# Patient Record
Sex: MALE | Race: WHITE | NOT HISPANIC OR LATINO | URBAN - METROPOLITAN AREA
[De-identification: names, ages, dates, MRNs, and addresses within clinical notes are randomized per-mention and may not be internally consistent; named-entity substitution may affect disease eponyms.]

---

## 2023-12-14 ENCOUNTER — APPOINTMENT (OUTPATIENT)
Dept: UROLOGY | Facility: CLINIC | Age: 83
End: 2023-12-14
Payer: MEDICARE

## 2024-01-22 ENCOUNTER — APPOINTMENT (OUTPATIENT)
Dept: UROLOGY | Facility: CLINIC | Age: 84
End: 2024-01-22
Payer: MEDICARE

## 2024-01-23 DIAGNOSIS — C61 MALIGNANT NEOPLASM OF PROSTATE (MULTI): Primary | ICD-10-CM

## 2024-02-01 PROBLEM — N52.9 ERECTILE DYSFUNCTION: Status: ACTIVE | Noted: 2024-02-01

## 2024-02-01 PROBLEM — C61 PROSTATE CA (MULTI): Status: ACTIVE | Noted: 2024-02-01

## 2024-02-01 PROBLEM — R97.20 ELEVATED PSA: Status: ACTIVE | Noted: 2024-02-01

## 2024-02-01 PROBLEM — R35.1 NOCTURIA: Status: ACTIVE | Noted: 2024-02-01

## 2024-02-01 ASSESSMENT — ENCOUNTER SYMPTOMS
DIFFICULTY URINATING: 0
FEVER: 0
PSYCHIATRIC NEGATIVE: 1
NAUSEA: 0
COUGH: 0
EYES NEGATIVE: 1
ENDOCRINE NEGATIVE: 1
CHILLS: 0
ALLERGIC/IMMUNOLOGIC NEGATIVE: 1
SHORTNESS OF BREATH: 0

## 2024-02-01 NOTE — PROGRESS NOTES
Subjective   Patient ID: Efra Retana is a 83 y.o. male.    HPI  Patient has a hx of prostate cancer. Dx 4/2021 [place] 4+4=8. . First and only Lupron was on 12/2/21. . Bone scan on 4/2021 showed no METS. Most recent PSA was 1.0 (1/24) 0.1 (6/23) Previous PSA was 0.71 on 12/22. Prior PSA was 1.15 on 4/22. Prior PSA was 2.7 on 10/2021. Prior PSA was 25.60 on 1/2021. Prior PSA was 34.30 on 10/2020. Prior PSA was 34.6 on 5/2020. Chronic LUT'S sx are mild and stable. Denies urgency and frequency. Denies dysuria. Denies hematuria. Nocturia x1. No medication for LUT'S. ED is chronic. No medication for this.          Review of Systems   Constitutional:  Negative for chills and fever.   HENT: Negative.     Eyes: Negative.    Respiratory:  Negative for cough and shortness of breath.    Cardiovascular:  Negative for chest pain and leg swelling.   Gastrointestinal:  Negative for nausea.   Endocrine: Negative.    Genitourinary:  Negative for difficulty urinating.        Negative except for documented in HPI   Allergic/Immunologic: Negative.    Neurological:         Alert & oriented X 3   Hematological:         Denies blood thinners   Psychiatric/Behavioral: Negative.         Objective   Physical Exam  Vitals and nursing note reviewed.   Constitutional:       General: He is not in acute distress.     Appearance: Normal appearance.   Pulmonary:      Effort: Pulmonary effort is normal.   Abdominal:      Tenderness: There is no abdominal tenderness.   Genitourinary:     Comments: Kidneys non palpable bilaterally  Bladder non palpable or tender  Scrotum no mass, No hydrocele  Epididymis- No spermatocele. Non Tender.  Testicles: No mass  Urethra: No discharge  Penis within normal limits... No lesions  Prostate - deferred  Neurological:      Mental Status: He is alert.         Assessment/Plan   Diagnoses and all orders for this visit:  Prostate CA (CMS/[facility])  Nocturia  Elevated PSA  Erectile dysfunction, unspecified erectile  dysfunction type    All available PSA values reviewed, Options discussed. Questions answered.  Pros/cons of Lupron Discussed-Patient wants to hold off   Diet changes for prostate health discussed and educational information given. Pros/Cons of prostate health supplements discussed.   Treatment options for LUTS reviewed  Discussed timed voiding. Discussed fluid and caffeine intake  Treatment options for ED reviewed.  Sildenafil Rx given  Lifestyle change to help prevent UTIs discussed. Encouraged fluid intake.    F/U with PSA 6 months

## 2024-02-05 ENCOUNTER — OFFICE VISIT (OUTPATIENT)
Dept: UROLOGY | Facility: CLINIC | Age: 84
End: 2024-02-05
Payer: MEDICARE

## 2024-02-05 VITALS — RESPIRATION RATE: 18 BRPM | WEIGHT: 186 LBS | BODY MASS INDEX: 25.23 KG/M2

## 2024-02-05 DIAGNOSIS — R35.1 NOCTURIA: ICD-10-CM

## 2024-02-05 DIAGNOSIS — N52.9 ERECTILE DYSFUNCTION, UNSPECIFIED ERECTILE DYSFUNCTION TYPE: ICD-10-CM

## 2024-02-05 DIAGNOSIS — R97.20 ELEVATED PSA: ICD-10-CM

## 2024-02-05 DIAGNOSIS — C61 PROSTATE CA (MULTI): ICD-10-CM

## 2024-02-05 PROCEDURE — 99214 OFFICE O/P EST MOD 30 MIN: CPT | Performed by: UROLOGY

## 2024-02-05 PROCEDURE — 1159F MED LIST DOCD IN RCRD: CPT | Performed by: UROLOGY

## 2024-02-05 PROCEDURE — 1036F TOBACCO NON-USER: CPT | Performed by: UROLOGY

## 2024-02-05 RX ORDER — MONTELUKAST SODIUM 10 MG/1
10 TABLET ORAL
COMMUNITY
Start: 2023-01-18

## 2024-02-05 RX ORDER — SILDENAFIL 100 MG/1
100 TABLET, FILM COATED ORAL AS NEEDED
Qty: 30 TABLET | Refills: 5 | Status: SHIPPED | OUTPATIENT
Start: 2024-02-05

## 2024-02-05 RX ORDER — ALBUTEROL SULFATE 90 UG/1
AEROSOL, METERED RESPIRATORY (INHALATION) EVERY 4 HOURS
COMMUNITY
Start: 2024-01-25

## 2024-02-05 RX ORDER — LEVOFLOXACIN 500 MG/1
500 TABLET, FILM COATED ORAL DAILY
COMMUNITY
Start: 2024-01-10 | End: 2024-01-17

## 2024-02-05 RX ORDER — SILDENAFIL 100 MG/1
100 TABLET, FILM COATED ORAL AS NEEDED
COMMUNITY
End: 2024-02-05 | Stop reason: SDUPTHER

## 2024-02-05 RX ORDER — METOPROLOL TARTRATE 100 MG/1
100 TABLET ORAL
COMMUNITY

## 2024-02-05 RX ORDER — METOPROLOL SUCCINATE 100 MG/1
100 TABLET, EXTENDED RELEASE ORAL
COMMUNITY
Start: 2015-09-21

## 2024-02-05 RX ORDER — AMLODIPINE BESYLATE 5 MG/1
5 TABLET ORAL
COMMUNITY
Start: 2023-08-08

## 2024-02-05 RX ORDER — FLUTICASONE PROPIONATE 50 MCG
SPRAY, SUSPENSION (ML) NASAL EVERY 24 HOURS
COMMUNITY
Start: 2024-01-15

## 2024-02-05 RX ORDER — POTASSIUM CHLORIDE 750 MG/1
CAPSULE, EXTENDED RELEASE ORAL
COMMUNITY
Start: 2019-10-19

## 2024-02-05 RX ORDER — OLMESARTAN MEDOXOMIL AND HYDROCHLOROTHIAZIDE 20/12.5 20; 12.5 MG/1; MG/1
1 TABLET ORAL DAILY PRN
COMMUNITY
Start: 2023-02-27

## 2024-02-05 RX ORDER — PANTOPRAZOLE SODIUM 40 MG/1
TABLET, DELAYED RELEASE ORAL EVERY 24 HOURS
COMMUNITY

## 2024-08-05 ENCOUNTER — APPOINTMENT (OUTPATIENT)
Dept: UROLOGY | Facility: CLINIC | Age: 84
End: 2024-08-05
Payer: MEDICARE

## 2024-08-09 ASSESSMENT — ENCOUNTER SYMPTOMS
PSYCHIATRIC NEGATIVE: 1
EYES NEGATIVE: 1
CHILLS: 0
FEVER: 0
COUGH: 0
ALLERGIC/IMMUNOLOGIC NEGATIVE: 1
SHORTNESS OF BREATH: 0
ENDOCRINE NEGATIVE: 1
DIFFICULTY URINATING: 0
NAUSEA: 0

## 2024-08-09 NOTE — PROGRESS NOTES
Subjective   Patient ID: Efra Retana is a 83 y.o. male.    HPI  Patient has a hx of prostate cancer. Dx 4/2021 [place] 4+4=8. . First and only Lupron was on 12/2/21. . Bone scan on 4/2021 showed no METS. Most recent PSA was 1.1 (8/24) Previous PSA was 1.0 (1/24) 0.1 (6/23) Previous PSA was 0.71 on 12/22. Prior PSA was 1.15 on 4/22. Prior PSA was 2.7 on 10/2021. Prior PSA was 25.60 on 1/2021. Prior PSA was 34.30 on 10/2020. Prior PSA was 34.6 on 5/2020. Chronic LUT'S sx are mild and stable. Denies urgency and frequency. Denies dysuria. Denies hematuria. Nocturia x1. No medication for LUT'S. ED is chronic. No medication for this. Patient has fallen 2x in last 18 months with broken shoulder and Hip.          Review of Systems   Constitutional:  Negative for chills and fever.   HENT: Negative.     Eyes: Negative.    Respiratory:  Negative for cough and shortness of breath.    Cardiovascular:  Negative for chest pain and leg swelling.   Gastrointestinal:  Negative for nausea.   Endocrine: Negative.    Genitourinary:  Negative for difficulty urinating.        Negative except for documented in HPI   Allergic/Immunologic: Negative.    Neurological:         Alert & oriented X 3   Hematological:         Denies blood thinners   Psychiatric/Behavioral: Negative.         Objective   Physical Exam  Vitals and nursing note reviewed.   Constitutional:       General: He is not in acute distress.     Appearance: Normal appearance.   Pulmonary:      Effort: Pulmonary effort is normal.   Abdominal:      Tenderness: There is no abdominal tenderness.   Genitourinary:     Comments: Kidneys non palpable bilaterally  Bladder non palpable or tender  Scrotum no mass, No hydrocele  Epididymis- No spermatocele. Non Tender.  Testicles: No mass. Soft  Urethra: No discharge  Penis within normal limits... No lesions  Prostate - symmetric, no nodules. BENIGN  Seminal Vesicals: No mass.  Sphincter tone: normal  Neurological:      Mental Status:  He is alert.         Assessment/Plan   Diagnoses and all orders for this visit:  Nocturia  Elevated PSA  Erectile dysfunction, unspecified erectile dysfunction type  Prostate CA (Multi)  Microhematuria    All available PSA values reviewed, Options discussed. Questions answered.  New PSA ordered   Diet changes for prostate health discussed and educational information given. Pros/Cons of prostate health supplements discussed.   Treatment options for LUTS reviewed  Discussed timed voiding. Discussed fluid and caffeine intake  Treatment options for ED reviewed-Observe  Lifestyle change to help prevent UTIs discussed. Encouraged fluid intake.  UA reviewed-Renal U/S and cysto scheduled    F/U PSA from [facility] and renal U/S and cysto

## 2024-08-12 ENCOUNTER — APPOINTMENT (OUTPATIENT)
Dept: UROLOGY | Facility: CLINIC | Age: 84
End: 2024-08-12
Payer: MEDICARE

## 2024-08-12 VITALS
DIASTOLIC BLOOD PRESSURE: 74 MMHG | HEART RATE: 72 BPM | RESPIRATION RATE: 16 BRPM | WEIGHT: 181 LBS | BODY MASS INDEX: 24.55 KG/M2 | SYSTOLIC BLOOD PRESSURE: 148 MMHG

## 2024-08-12 DIAGNOSIS — R35.1 NOCTURIA: Primary | ICD-10-CM

## 2024-08-12 DIAGNOSIS — R31.29 MICROHEMATURIA: ICD-10-CM

## 2024-08-12 DIAGNOSIS — R97.20 ELEVATED PSA: ICD-10-CM

## 2024-08-12 DIAGNOSIS — C61 PROSTATE CA (MULTI): ICD-10-CM

## 2024-08-12 DIAGNOSIS — N52.9 ERECTILE DYSFUNCTION, UNSPECIFIED ERECTILE DYSFUNCTION TYPE: ICD-10-CM

## 2024-08-12 PROCEDURE — 1036F TOBACCO NON-USER: CPT | Performed by: UROLOGY

## 2024-08-12 PROCEDURE — 99214 OFFICE O/P EST MOD 30 MIN: CPT | Performed by: UROLOGY

## 2024-08-12 PROCEDURE — 1159F MED LIST DOCD IN RCRD: CPT | Performed by: UROLOGY

## 2024-08-27 NOTE — PROGRESS NOTES
Patient ID: Efra Retana is a 84 y.o. male.    Procedures  The patient was prepped using a Betadine solution. Lidocaine jelly was instilled into the urethra. The flexible cystoscope was sterilely inserted into the urethra and formal cystoscopy performed in a systematic fashion. . For detailed findings of the procedure, please see Dr. Arthur remarks below  CIPRO 250MG POBID TIMES 3 DAYS GIVEN     PSA 1.1 (7/2024)   1.0 (1/2024)   0.1 (6/2021)    CT 8/20/2024  1. 3 mm nonobstructing lower pole left renal calculus.  There is some mild dilatation of the left renal pelvis which demonstrates wall enhancement and mild adjacent edematous change.  This could be seen with ureteritis but might also represent some degree of transient or intermittent obstruction by the lower pole calculus despite the absence of hydronephrosis.   2. 2 ventral abdominal wall hernias are identified.  The umbilicus.  One contains a portion of nonobstructed small bowel and the 2nd contains a portion of the mid transverse colon.   3. 2 small urinary bladder diverticula are present.  The larger diverticulum contains some minimal layering calcification.   4. Atherosclerotic changes of the aorta and iliac arteries.     Cysto notes    NO MASS. DIVERTICULA PRESENT. SEVERAL SMALL STONES WERE IRRIGATED OUT. SOME TRABECULATION    F/U 6 MONTHS WITH PSA.

## 2024-08-29 ENCOUNTER — APPOINTMENT (OUTPATIENT)
Dept: UROLOGY | Facility: CLINIC | Age: 84
End: 2024-08-29
Payer: MEDICARE

## 2024-08-29 VITALS
DIASTOLIC BLOOD PRESSURE: 74 MMHG | BODY MASS INDEX: 25.19 KG/M2 | HEIGHT: 72 IN | RESPIRATION RATE: 16 BRPM | WEIGHT: 186 LBS | SYSTOLIC BLOOD PRESSURE: 130 MMHG

## 2024-08-29 DIAGNOSIS — Z85.46 HISTORY OF PROSTATE CANCER: Primary | ICD-10-CM

## 2024-08-29 DIAGNOSIS — R10.30 LOWER ABDOMINAL PAIN: ICD-10-CM

## 2024-08-29 RX ORDER — CIPROFLOXACIN 250 MG/1
250 TABLET, FILM COATED ORAL 2 TIMES DAILY
Qty: 6 TABLET | Refills: 0 | Status: SHIPPED | OUTPATIENT
Start: 2024-08-29 | End: 2024-09-01

## 2025-02-18 ASSESSMENT — ENCOUNTER SYMPTOMS
FEVER: 0
DIFFICULTY URINATING: 0
PSYCHIATRIC NEGATIVE: 1
COUGH: 0
SHORTNESS OF BREATH: 0
ALLERGIC/IMMUNOLOGIC NEGATIVE: 1
CHILLS: 0
EYES NEGATIVE: 1
ENDOCRINE NEGATIVE: 1
NAUSEA: 0

## 2025-02-18 NOTE — PROGRESS NOTES
Subjective   Patient ID: Efra Retana is a 84 y.o. male.    Virtual or Telephone Consent    An interactive audio and video telecommunication system which permits real time communications between the patient (at the originating site) and provider (at the distant site) was utilized to provide this telehealth service.   Verbal consent was requested and obtained from Efra Retana on this date, 02/20/25 for a telehealth visit.     HPI  Patient is here for gross hematuria. He states this lasted a few days and resolved, . No other symptom except some right groin pain when sitting for extended time in car. . Cysto done on 8/24. CT done on 8/24 showed kidney stones. Hx of bladder stones. Several small stones irrigated out on 8/24. Hx of prostate cancer. Dx 4/2021 [place] 4+4=8. . First and only Lupron was on 12/2/21. . Bone scan on 4/2021 showed no METS. Most recent PSA was 1.1 (8/24) Previous PSA was 1.0 (1/24) 0.1 (6/23) Previous PSA was 0.71 on 12/22. Prior PSA was 1.15 on 4/22. Prior PSA was 2.7 on 10/2021. Prior PSA was 25.60 on 1/2021. Prior PSA was 34.30 on 10/2020. Prior PSA was 34.6 on 5/2020.        Review of Systems   Constitutional:  Negative for chills and fever.   HENT: Negative.     Eyes: Negative.    Respiratory:  Negative for cough and shortness of breath.    Cardiovascular:  Negative for chest pain and leg swelling.   Gastrointestinal:  Negative for nausea.   Endocrine: Negative.    Genitourinary:  Negative for difficulty urinating.        Negative except for documented in HPI   Allergic/Immunologic: Negative.    Neurological:         Alert & oriented X 3   Hematological:         Denies blood thinners   Psychiatric/Behavioral: Negative.         Objective   Physical Exam  No PE done given the virtual nature of visit.   Assessment/Plan       Diagnoses and all orders for this visit:  Prostate CA (Multi)  Erectile dysfunction, unspecified erectile dysfunction type  Gross hematuria    All available PSA  values reviewed, Options discussed. Questions answered.  Patient has had a single Lupron. Pros/cons of this discussed  Past CT and renal U/S reviewed  Cysto notes reviewed   Diet changes for prostate health discussed and educational information given. Pros/Cons of prostate health supplements discussed.   Treatment options for LUTS reviewed-not bothersome currently  Discussed timed voiding. Discussed fluid and caffeine intake  Treatment options for ED reviewed-Observe  Lifestyle change to help prevent UTIs discussed. Encouraged fluid intake.  UA ordered  F/U 2 months with PSA  and UA    12 minutes spent total

## 2025-02-20 ENCOUNTER — APPOINTMENT (OUTPATIENT)
Dept: UROLOGY | Facility: CLINIC | Age: 85
End: 2025-02-20
Payer: MEDICARE

## 2025-02-20 DIAGNOSIS — R31.0 GROSS HEMATURIA: ICD-10-CM

## 2025-02-20 DIAGNOSIS — N52.9 ERECTILE DYSFUNCTION, UNSPECIFIED ERECTILE DYSFUNCTION TYPE: ICD-10-CM

## 2025-02-20 DIAGNOSIS — C61 PROSTATE CA (MULTI): ICD-10-CM

## 2025-02-20 RX ORDER — DULOXETIN HYDROCHLORIDE 30 MG/1
30 CAPSULE, DELAYED RELEASE ORAL DAILY
COMMUNITY

## 2025-02-20 RX ORDER — FUROSEMIDE 20 MG/1
20 TABLET ORAL EVERY OTHER DAY
COMMUNITY

## 2025-03-03 ENCOUNTER — APPOINTMENT (OUTPATIENT)
Dept: UROLOGY | Facility: CLINIC | Age: 85
End: 2025-03-03
Payer: MEDICARE

## 2025-03-20 DIAGNOSIS — C61 PROSTATE CA (MULTI): ICD-10-CM
